# Patient Record
Sex: FEMALE | Race: WHITE | NOT HISPANIC OR LATINO | Employment: OTHER | URBAN - METROPOLITAN AREA
[De-identification: names, ages, dates, MRNs, and addresses within clinical notes are randomized per-mention and may not be internally consistent; named-entity substitution may affect disease eponyms.]

---

## 2022-09-06 PROBLEM — E87.6 HYPOKALEMIA DUE TO EXCESSIVE RENAL LOSS OF POTASSIUM: Status: ACTIVE | Noted: 2022-09-06

## 2022-09-06 PROBLEM — K21.9 GERD (GASTROESOPHAGEAL REFLUX DISEASE): Status: ACTIVE | Noted: 2022-09-06

## 2022-09-06 PROBLEM — F32.A DEPRESSION: Status: ACTIVE | Noted: 2022-09-06

## 2022-09-06 PROBLEM — D46.9 MYELODYSPLASTIC SYNDROME, UNSPECIFIED: Status: ACTIVE | Noted: 2022-09-06

## 2022-09-07 PROBLEM — I48.91 ATRIAL FIBRILLATION WITH RVR: Status: ACTIVE | Noted: 2022-09-07

## 2022-09-07 PROBLEM — D64.9 ANEMIA: Status: ACTIVE | Noted: 2022-09-07

## 2022-09-07 PROBLEM — I26.99 ACUTE PULMONARY EMBOLISM: Status: ACTIVE | Noted: 2022-09-07

## 2022-09-08 PROBLEM — I50.23 ACUTE ON CHRONIC SYSTOLIC HEART FAILURE: Status: ACTIVE | Noted: 2022-09-08

## 2022-09-08 PROBLEM — I50.22 CHRONIC SYSTOLIC HEART FAILURE: Status: ACTIVE | Noted: 2022-09-08

## 2022-09-08 PROBLEM — E87.6 HYPOKALEMIA: Status: RESOLVED | Noted: 2022-09-06 | Resolved: 2022-09-08

## 2022-09-21 PROBLEM — I48.19 PERSISTENT ATRIAL FIBRILLATION: Status: ACTIVE | Noted: 2022-09-21

## 2022-09-21 PROBLEM — Z86.711 HISTORY OF PULMONARY EMBOLISM: Status: ACTIVE | Noted: 2022-09-07

## 2022-10-04 PROBLEM — I48.91 ATRIAL FIBRILLATION WITH RVR: Status: RESOLVED | Noted: 2022-09-07 | Resolved: 2022-10-04

## 2022-10-04 PROBLEM — D62 ACUTE BLOOD LOSS ANEMIA: Status: ACTIVE | Noted: 2022-10-04

## 2022-10-04 PROBLEM — D64.9 ANEMIA: Status: RESOLVED | Noted: 2022-09-07 | Resolved: 2022-10-04

## 2023-02-24 PROBLEM — I95.0 IDIOPATHIC HYPOTENSION: Status: ACTIVE | Noted: 2023-02-24

## 2023-02-24 PROBLEM — D63.8 ANEMIA IN OTHER CHRONIC DISEASES CLASSIFIED ELSEWHERE: Status: ACTIVE | Noted: 2022-09-07

## 2023-02-24 PROBLEM — E03.9 HYPOTHYROID: Status: ACTIVE | Noted: 2023-02-24

## 2023-02-24 PROBLEM — E87.1 HYPONATREMIA: Status: ACTIVE | Noted: 2023-02-24

## 2023-02-25 PROBLEM — S71.002A OPEN WOUND OF LEFT HIP: Status: ACTIVE | Noted: 2023-02-25

## 2023-02-25 PROBLEM — N17.9 AKI (ACUTE KIDNEY INJURY): Status: ACTIVE | Noted: 2023-02-25

## 2023-02-26 PROBLEM — I50.9 ACUTE EXACERBATION OF CHF (CONGESTIVE HEART FAILURE): Status: ACTIVE | Noted: 2023-02-26

## 2023-02-26 PROBLEM — J18.9 PNEUMONIA: Status: ACTIVE | Noted: 2023-02-26

## 2023-02-27 PROBLEM — E44.0 MODERATE MALNUTRITION: Status: ACTIVE | Noted: 2023-02-27

## 2023-03-07 PROBLEM — L89.220 PRESSURE INJURY OF LEFT HIP, UNSTAGEABLE: Status: ACTIVE | Noted: 2023-03-07

## 2023-05-29 PROBLEM — N17.9 AKI (ACUTE KIDNEY INJURY): Status: RESOLVED | Noted: 2023-02-25 | Resolved: 2023-05-29

## 2023-05-29 PROBLEM — J18.9 PNEUMONIA: Status: RESOLVED | Noted: 2023-02-26 | Resolved: 2023-05-29

## 2023-09-08 PROBLEM — D64.9 ANEMIA: Status: ACTIVE | Noted: 2022-09-06

## 2023-09-08 PROBLEM — R54 AGE-RELATED PHYSICAL DEBILITY: Status: ACTIVE | Noted: 2023-09-08

## 2023-09-08 PROBLEM — R06.02 SHORTNESS OF BREATH: Status: ACTIVE | Noted: 2023-09-08

## 2023-09-08 PROBLEM — I48.91 A-FIB: Status: ACTIVE | Noted: 2022-09-21

## 2023-09-09 PROBLEM — J15.7 MYCOPLASMA PNEUMONIA: Status: ACTIVE | Noted: 2023-09-09

## 2023-09-09 PROBLEM — J96.01 ACUTE HYPOXEMIC RESPIRATORY FAILURE: Status: ACTIVE | Noted: 2023-09-08

## 2023-10-23 PROBLEM — E78.2 MIXED HYPERLIPIDEMIA: Status: ACTIVE | Noted: 2023-10-23

## 2023-10-23 PROBLEM — R73.03 PREDIABETES: Status: ACTIVE | Noted: 2023-10-23

## 2023-12-11 PROBLEM — J15.7 MYCOPLASMA PNEUMONIA: Status: RESOLVED | Noted: 2023-09-09 | Resolved: 2023-12-11

## 2023-12-11 PROBLEM — J96.01 ACUTE HYPOXEMIC RESPIRATORY FAILURE: Status: RESOLVED | Noted: 2023-09-08 | Resolved: 2023-12-11

## 2023-12-11 PROBLEM — N17.9 AKI (ACUTE KIDNEY INJURY): Status: RESOLVED | Noted: 2023-02-25 | Resolved: 2023-12-11

## 2024-05-06 PROBLEM — N18.31 STAGE 3A CHRONIC KIDNEY DISEASE: Status: ACTIVE | Noted: 2024-05-06

## 2024-05-06 PROBLEM — Z99.81 HYPOXEMIA REQUIRING SUPPLEMENTAL OXYGEN: Status: ACTIVE | Noted: 2024-05-06

## 2024-05-06 PROBLEM — R09.02 HYPOXEMIA REQUIRING SUPPLEMENTAL OXYGEN: Status: ACTIVE | Noted: 2024-05-06

## 2024-05-17 PROBLEM — I51.7 ATRIAL ENLARGEMENT, BILATERAL: Status: ACTIVE | Noted: 2024-05-17

## 2024-05-26 PROBLEM — R42 DIZZINESS: Status: ACTIVE | Noted: 2024-05-26

## 2024-07-02 ENCOUNTER — OUTPATIENT CASE MANAGEMENT (OUTPATIENT)
Dept: ADMINISTRATIVE | Facility: OTHER | Age: 89
End: 2024-07-02

## 2024-07-03 ENCOUNTER — OUTPATIENT CASE MANAGEMENT (OUTPATIENT)
Dept: ADMINISTRATIVE | Facility: OTHER | Age: 89
End: 2024-07-03

## 2024-07-08 ENCOUNTER — OUTPATIENT CASE MANAGEMENT (OUTPATIENT)
Dept: ADMINISTRATIVE | Facility: OTHER | Age: 89
End: 2024-07-08

## 2024-07-08 NOTE — PROGRESS NOTES
Outpatient Care Management  Initial Patient Assessment    Patient: Katie Razo  MRN: [id number]  Date of Service: 07/08/2024  Completed by: Marlene Jackson RN  Referral Date: 06/20/2024  Date of Eligibility: 7/1/2024  Program:   High Risk  Status: Ongoing  Effective Dates: 7/8/2024 - present  Responsible Staff: [facility]NAG[facility]        Reason for Visit   Patient presents with    OPCM Enrollment Call    Nursing Assessment    OPCM Chart Review       Brief Summary:  Katie Razo was referred by Dr. Nabor Orellana for chronic Afib. Patient qualifies for program based on 62% risk score on 5/6/24.   Active problem list, medical, surgical and social history reviewed. Active comorbidities include moderate malnutrition, hypothyroidism, chronic systolic heart failure, hx of PE, myelodysplastic syndrome anemia, cervical spondylosis, chronic left shoulder pain, depression, GERD, high cholesterol, HTN, renal disorder, panic attack, CKD stage 3. Areas of need identified by patient include keeping illnesses under control. Patient lives alone, Tish (sitter/caregiver) comes over for 6 hours a day to assist with needs. Patient says she is mostly independent with ADLs. Patient is blind and enjoys listening to tapes she receives from a facility in [place] for the blind. Denies issues with SDOH, informed of LSW availability if needed in the future. Patient says her son, Sabino, lives outside of [place] and plans to visit soon and stay with her while in town. Patient denies falls, ambulates with a walker.  Next steps: plan to follow up in approximately one week - patient agrees. Plan to do med rec and adherence, living will?, incontinent? (To complete initial assessment). How is pain? Rating? Further discuss diet - heart healthy?      Disability Status  Is the patient alert and oriented (person, place, time, and situation)?: Alert and oriented x 4  Hearing Difficulty or Deaf: no  Visual  Difficulty or Blind: yes  Visual and Hearing Needs Conclusion: blind  Vision Management: Other  Difficulty Concentrating, Remembering or Making Decisions: no  Communication Difficulty: no  Eating/Swallowing Difficulty: no  Walking or Climbing Stairs Difficulty: yes  Walking or Climbing Stairs: ambulation difficulty, requires equipment  Dressing/Bathing Difficulty: no  Toileting : Independent  Difficulty Managing Errands Independently: yes  Errands Management: blind, requires someone to drive  Equipment Currently Used at Home: bath bench; blood pressure machine; grab bar; walker, rolling  ADL Conclusion Statement: requires assistance at times from sitter/assistant  Change in Functional Status Since Onset of Current Illness/Injury: no        Spiritual Beliefs  Spiritual, Cultural Beliefs, Tenriism Practices, Values that Affect Care: no      Social History     Socioeconomic History    Marital status:     Number of children: 2   Occupational History    Occupation: Retired   Tobacco Use    Smoking status: Never     Passive exposure: Past    Smokeless tobacco: Never   Substance and Sexual Activity    Alcohol use: Never    Drug use: Never    Sexual activity: Not Currently     Social Determinants of Health     Financial Resource Strain: Low Risk  (5/27/2024)    Overall Financial Resource Strain (CARDIA)     Difficulty of Paying Living Expenses: Not hard at all   Food Insecurity: No Food Insecurity (5/27/2024)    Hunger Vital Sign     Worried About Running Out of Food in the Last Year: Never true     Ran Out of Food in the Last Year: Never true   Transportation Needs: No Transportation Needs (5/27/2024)    TRANSPORTATION NEEDS     Transportation : No   Housing Stability: Low Risk  (5/27/2024)    Housing Stability Vital Sign     Unable to Pay for Housing in the Last Year: No     Homeless in the Last Year: No       Roles and Relationships  Primary Source of Support/Comfort: child(emmanuel)  Name of Support/Comfort Primary  Source: Sabino villarreal      Advance Directives (For Healthcare)  Advance Directive  (If Adv Dir status is received, view document under Adv Dir in header or Chart Review Media tab): Patient has advance directive, copy not received.        Patient Reported Insurance  Verified current insurance plan:: Medicare            7/8/2024     3:07 PM 5/15/2024    10:36 AM 2/21/2023     1:19 PM   Depression Patient Health Questionnaire   Over the last two weeks how often have you been bothered by little interest or pleasure in doing things Not at all Not at all Not at all   Over the last two weeks how often have you been bothered by feeling down, depressed or hopeless Not at all Not at all Not at all   PHQ-2 Total Score 0 0 0       Learning Assessment       07/08/2024 1512 [facility] (7/8/2024 - Present)   Created by Marlene Jackson RN -  (Nurse) Status: Complete                 PRIMARY LEARNER     Primary Learner Name:  self, Ms. Razo BD - 07/08/2024 1512    Relationship:  Patient BD - 07/08/2024 1512    Does the primary learner have any barriers to learning?:  Visual BD - 07/08/2024 1512    What is the preferred language of the primary learner?:  English BD - 07/08/2024 1512    Is an  required?:  Yes BD - 07/08/2024 1512    How does the primary learner prefer to learn new concepts?:  Listening, Demonstration, Pictures/Video BD - 07/08/2024 1512    How often do you need to have someone help you read instructions, pamphlets, or written material from your doctor or pharmacy?:  Always BD - 07/08/2024 1512        CO-LEARNER #1     No question answered        CO-LEARNER #2     No question answered        SPECIAL TOPICS     No question answered        ANSWERED BY:     -:  Patient BD - 07/08/2024 1512        Comments         Edit History       Marlene Jackson, RN -  (Nurse)   07/08/2024 1512

## 2024-07-08 NOTE — LETTER
July 8, 2024             Dear Katie,    Welcome to [facility] Complex Care Management Program.  It was a pleasure talking with you today.  My name is Marlene Jackson, and I look forward to being your Care Manager.  My goal is to help you function at the healthiest and highest level possible.  You can contact me directly at [phone].    As an [facility] patient, some of the services we may be able to provide include:     Development of an individualized care plan with a Registered Nurse   Connection with a   Connection with available resources and services    Coordinate communication among your care team members   Provide coaching and education   Help you understand your doctors treatment plan  Help you obtain information about your insurance coverage.     All services provided by [facility] Complex Care Managers and other care team members are coordinated with and communicated to your primary care team.      As part of your enrollment, you will be receiving education materials and more information about these services in your [facility] account, by phone or through the mail.  If you do not wish to participate or receive information, please contact our office at [phone].      Sincerely,        Marlene Jackson, RN  [facility]   Outpatient RN Complex Care Manager

## 2024-07-16 ENCOUNTER — OUTPATIENT CASE MANAGEMENT (OUTPATIENT)
Dept: ADMINISTRATIVE | Facility: OTHER | Age: 89
End: 2024-07-16

## 2024-07-22 ENCOUNTER — OUTPATIENT CASE MANAGEMENT (OUTPATIENT)
Dept: ADMINISTRATIVE | Facility: OTHER | Age: 89
End: 2024-07-22

## 2024-07-22 NOTE — LETTER
Katie Razo  [address] [place] [address] [address]          Dear Katie Razo,     I am your nurse with [facility] Outpatient Care Management Department. I was unsuccessful in reaching you today. At your earliest convenience, I would like to discuss your healthcare progress.      Please contact me at [phone] from 8:00AM to 4:30 PM on Monday thru Friday.     As you know, [facility] is a program offered to you through [facility] where a nurse is available 24/7 to answer questions or provide medical advice, their number is [phone].    Thanks,  Amy Chery RN [facility]   Outpatient Care Management

## 2024-07-22 NOTE — PROGRESS NOTES
7/22/2024  2nd attempt to complete Follow-Up  for Outpatient Care Management, left message.  Will mail unable to assess letter.

## 2024-07-29 ENCOUNTER — OUTPATIENT CASE MANAGEMENT (OUTPATIENT)
Dept: ADMINISTRATIVE | Facility: OTHER | Age: 89
End: 2024-07-29

## 2024-07-29 NOTE — PROGRESS NOTES
07/29/24-Called Mrs. Razo and she asked me to call Dr. Esteban [place]'s nurse.   Message sent to Leta Hernandez LVN. Received referral from Dr. Alcala on 06/2024 for Chronic atrial fibrillation   Will follow up with patient another day.   07/31/24-Called [phone] and was transferred to Leta's phone and it just rings.Will send message to PCP staff.

## 2024-08-05 PROBLEM — J15.7 PNEUMONIA DUE TO MYCOPLASMA PNEUMONIAE: Status: RESOLVED | Noted: 2023-09-09 | Resolved: 2024-08-05

## 2024-08-12 ENCOUNTER — OUTPATIENT CASE MANAGEMENT (OUTPATIENT)
Dept: ADMINISTRATIVE | Facility: OTHER | Age: 89
End: 2024-08-12

## 2024-08-12 NOTE — LETTER
August 12, 2024             Dear Katie Razo:    Welcome to [facility] Complex [facility] Management Program. My name is   Amy Chery RN CCM and I look forward to being your .  My goal is to help you function at the healthiest and highest level possible.  You can contact me directly at [phone].    As an [facility] patient, some of the services we may be able to provide include:     Development of an individualized care plan with a Registered Nurse   Connection with a   Connection with available resources and services    Coordinate communication among your care team members   Provide coaching and education   Help you understand your doctors treatment plan  Help you obtain information about your insurance coverage.     All services provided by [facility] Complex Care Managers and other care team members are coordinated with and communicated to your primary care team.      As part of your enrollment, you will be receiving education materials and more information about these services in your [facility] account, by phone or through the mail.  If you do not wish to participate or receive information, please contact our office at [phone].      Sincerely,        Amy Chery RN CCM   [facility]   Out-patient RN Complex Care Manager

## 2024-08-12 NOTE — LETTER
Katie Razo  [address] [address]          Dear Katie Razo,     I am your nurse with [facility] Outpatient [facility] Management Department. I was unsuccessful in reaching you today. At your earliest convenience, I would like to discuss your healthcare progress.      Please contact me at [phone] from 8:00AM to 4:30 PM on Monday thru Friday.     As you know, [facility] is a program offered to you through [facility] where a nurse is available 24/7 to answer questions or provide medical advice, their number is [phone].    Thanks,      Amy Chery RN [facility]   Outpatient Care Management

## 2024-08-12 NOTE — PROGRESS NOTES
08/12/24- Called [phone] [facility] and was transferred to [facility]. July will call Mrs Razo and reassure her that provider did place OPCM referral and she can talk to RN. Gave July my contact info.   8/12/2024  3rd attempt to complete Follow-Up  for Outpatient Care Management, left message.  Will mail unable to assess letter.

## 2024-08-19 ENCOUNTER — OUTPATIENT CASE MANAGEMENT (OUTPATIENT)
Dept: ADMINISTRATIVE | Facility: OTHER | Age: 89
End: 2024-08-19

## 2024-08-19 NOTE — PROGRESS NOTES
Outpatient Care Management  Plan of Care Follow Up Visit    Patient: Katie Razo  MRN: [id number]  Date of Service: 08/19/2024  Completed by: Amy Chery RN  Referral Date: 06/20/2024    Reason for Visit   Patient presents with    OPCM RN Follow Up Call       Brief Summary: 8/19/2024  4'th attempt to complete Follow-Up  for Outpatient Care Management, left message.  Will dis-enroll from OPCM. OPCM Case Closure.   Called Mrs. Razo back after receiving a voice mail for a follow  up.   Med Rec done to [facility] and then she said that she had talked to much and she needed to go. Will finish med rec next follow up in two weeks per her request.   Refer to [facility] for SDOH  Next Steps: Follow up in two weeks

## 2024-09-03 ENCOUNTER — OUTPATIENT CASE MANAGEMENT (OUTPATIENT)
Dept: ADMINISTRATIVE | Facility: OTHER | Age: 89
End: 2024-09-03

## 2024-09-03 NOTE — PROGRESS NOTES
Outpatient Care Management  Plan of Care Follow Up Visit    Patient: Kaite Razo  MRN: [id number]  Date of Service: 09/03/2024  Completed by: Amy Chery RN  Referral Date: 06/20/2024    No chief complaint on file.      Brief Summary: SDOH done. Med Rec finished.   Next Steps: Follow up in three weeks   Review when is her next cardiology appt  Review diet and s/s of heart failure   Review using ice or heat for pain

## 2024-09-23 ENCOUNTER — OUTPATIENT CASE MANAGEMENT (OUTPATIENT)
Dept: ADMINISTRATIVE | Facility: OTHER | Age: 89
End: 2024-09-23

## 2024-09-23 NOTE — PROGRESS NOTES
9/23/2024  1st attempt to complete Follow-Up for Outpatient Care Management, left message. Attempt letter sent.

## 2024-10-02 ENCOUNTER — OUTPATIENT CASE MANAGEMENT (OUTPATIENT)
Dept: ADMINISTRATIVE | Facility: OTHER | Age: 89
End: 2024-10-02

## 2024-10-02 NOTE — PROGRESS NOTES
Outpatient Care Management  Plan of Care Follow Up Visit    Patient: Katie Razo  MRN: [id number]  Date of Service: 10/02/2024  Completed by: Amy Chery RN  Referral Date: 06/20/2024    Reason for Visit   Patient presents with    OPCM RN Follow Up Call           Case Closure       Brief Summary: Care plan goals met. Will dis-enroll from OPCM. Will send OPCM RN card to patient. OPCM Case Closure.